# Patient Record
Sex: MALE | Race: WHITE | Employment: FULL TIME | URBAN - METROPOLITAN AREA
[De-identification: names, ages, dates, MRNs, and addresses within clinical notes are randomized per-mention and may not be internally consistent; named-entity substitution may affect disease eponyms.]

---

## 2024-08-27 PROBLEM — K40.90 LEFT INGUINAL HERNIA: Status: ACTIVE | Noted: 2024-08-27

## 2024-08-27 PROBLEM — R10.32 LEFT GROIN PAIN: Status: ACTIVE | Noted: 2024-08-27

## 2024-10-03 ENCOUNTER — HOSPITAL ENCOUNTER (OUTPATIENT)
Dept: PET IMAGING | Age: 64
Discharge: HOME | End: 2024-10-03
Payer: COMMERCIAL

## 2024-10-03 DIAGNOSIS — K62.89 RECTAL MASS: ICD-10-CM

## 2024-10-03 DIAGNOSIS — R91.8 LUNG NODULES: ICD-10-CM

## 2024-10-03 PROCEDURE — A9609 HC RX DIAGNOSTIC RADIOPHARMACEUTICAL: HCPCS | Performed by: FAMILY MEDICINE

## 2024-10-03 PROCEDURE — 2580000003 HC RX 258: Performed by: FAMILY MEDICINE

## 2024-10-03 PROCEDURE — 3430000000 HC RX DIAGNOSTIC RADIOPHARMACEUTICAL: Performed by: FAMILY MEDICINE

## 2024-10-03 PROCEDURE — 78815 PET IMAGE W/CT SKULL-THIGH: CPT

## 2024-10-03 RX ORDER — FLUDEOXYGLUCOSE F 18 200 MCI/ML
14.04 INJECTION, SOLUTION INTRAVENOUS
Status: COMPLETED | OUTPATIENT
Start: 2024-10-03 | End: 2024-10-03

## 2024-10-03 RX ORDER — SODIUM CHLORIDE 0.9 % (FLUSH) 0.9 %
10 SYRINGE (ML) INJECTION PRN
Status: COMPLETED | OUTPATIENT
Start: 2024-10-03 | End: 2024-10-03

## 2024-10-03 RX ADMIN — FLUDEOXYGLUCOSE F 18 14.04 MILLICURIE: 200 INJECTION, SOLUTION INTRAVENOUS at 08:03

## 2024-10-03 RX ADMIN — SODIUM CHLORIDE, PRESERVATIVE FREE 10 ML: 5 INJECTION INTRAVENOUS at 08:03

## 2024-10-09 ENCOUNTER — INITIAL CONSULT (OUTPATIENT)
Dept: CARDIOLOGY CLINIC | Age: 64
End: 2024-10-09
Payer: COMMERCIAL

## 2024-10-09 VITALS
SYSTOLIC BLOOD PRESSURE: 118 MMHG | HEIGHT: 67 IN | BODY MASS INDEX: 30.98 KG/M2 | HEART RATE: 63 BPM | DIASTOLIC BLOOD PRESSURE: 82 MMHG | WEIGHT: 197.4 LBS

## 2024-10-09 DIAGNOSIS — I71.21 ANEURYSM OF ASCENDING AORTA WITHOUT RUPTURE (HCC): Primary | ICD-10-CM

## 2024-10-09 PROCEDURE — G8417 CALC BMI ABV UP PARAM F/U: HCPCS | Performed by: INTERNAL MEDICINE

## 2024-10-09 PROCEDURE — 1036F TOBACCO NON-USER: CPT | Performed by: INTERNAL MEDICINE

## 2024-10-09 PROCEDURE — G8427 DOCREV CUR MEDS BY ELIG CLIN: HCPCS | Performed by: INTERNAL MEDICINE

## 2024-10-09 PROCEDURE — 99204 OFFICE O/P NEW MOD 45 MIN: CPT | Performed by: INTERNAL MEDICINE

## 2024-10-09 PROCEDURE — 3017F COLORECTAL CA SCREEN DOC REV: CPT | Performed by: INTERNAL MEDICINE

## 2024-10-09 PROCEDURE — 93000 ELECTROCARDIOGRAM COMPLETE: CPT | Performed by: INTERNAL MEDICINE

## 2024-10-09 PROCEDURE — G8484 FLU IMMUNIZE NO ADMIN: HCPCS | Performed by: INTERNAL MEDICINE

## 2024-10-09 RX ORDER — IBUPROFEN 800 MG/1
TABLET, FILM COATED ORAL PRN
COMMUNITY

## 2024-10-09 RX ORDER — TERBINAFINE HYDROCHLORIDE 250 MG/1
250 TABLET ORAL DAILY
COMMUNITY
Start: 2024-09-30

## 2024-10-09 NOTE — PROGRESS NOTES
Respiratory:  Resp Assessment: No abnormal findings.  Resp Auscultation: Vesicular breath sounds without rales or wheezing.  5. Cardiovascular:  Auscultation: Normal S1 & S2, No prominent murmurs  Carotid Arteries: No bruits present  Abdominal Aorta: Non-palpable  Pedal Pulses: 2+ and equal   6. Abdomen:  No masses or tenderness  Liver/Spleen: No Abnormalities Noted, no organomegaly.  7. Musculoskeletal: No joint deformities. No muscle wasting  8. Extremities:   No Cyanosis or Clubbing. No significant edema   9. Rectal / genital:   Deferred  10. Neurological/Psychiatric:  Oriented to time, place, and person  Non-anxious    No results found for: \"CKTOTAL\", \"CKMB\", \"CKMBINDEX\", \"TROPONINT\"  BNP:  No results found for: \"BNP\", \"PROBNP\"  PT/INR:  No results found for: \"PTINR\"  A1C:No results found for: \"LABA1C\"  Lipids:   Lab Results   Component Value Date    CHOL 206 (H) 06/16/2015    CHOL 221 (H) 02/27/2012     Lab Results   Component Value Date    TRIG 375 (H) 06/16/2015    TRIG 540 (H) 02/27/2012     Lab Results   Component Value Date    HDL 30 (L) 06/16/2015    HDL 30 (L) 02/27/2012     No results found for: \"LDL\"  No results found for: \"VLDL\"  No results found for: \"CHOLHDLRATIO\"   BMP:    Lab Results   Component Value Date/Time     06/16/2015 06:49 AM     02/27/2012 07:01 AM    K 4.3 06/16/2015 06:49 AM    K 4.0 02/27/2012 07:01 AM     06/16/2015 06:49 AM     02/27/2012 07:01 AM    CO2 26 06/16/2015 06:49 AM    CO2 31 02/27/2012 07:01 AM    BUN 13 06/16/2015 06:49 AM    BUN 15 02/27/2012 07:01 AM    CREATININE 0.9 06/16/2015 06:49 AM    CREATININE 0.8 02/27/2012 07:01 AM     CMP:  Lab Results   Component Value Date/Time     06/16/2015 06:49 AM     02/27/2012 07:01 AM    K 4.3 06/16/2015 06:49 AM    K 4.0 02/27/2012 07:01 AM     06/16/2015 06:49 AM     02/27/2012 07:01 AM    CO2 26 06/16/2015 06:49 AM    CO2 31 02/27/2012 07:01 AM    BUN 13 06/16/2015 06:49 AM    BUN

## 2024-10-10 ENCOUNTER — TELEPHONE (OUTPATIENT)
Dept: CARDIOLOGY CLINIC | Age: 64
End: 2024-10-10

## 2024-10-10 NOTE — TELEPHONE ENCOUNTER
Test Ordered:   CT Chest W & W/o Contrast    /  Insurance: [facility]  /  Authorization Status: No Auth Required

## 2025-07-18 ENCOUNTER — TELEPHONE (OUTPATIENT)
Dept: CARDIOLOGY CLINIC | Age: 65
End: 2025-07-18

## 2025-07-18 NOTE — PROGRESS NOTES
Patient Name: Zacarias Xavier  : 1960  MRN# [id number]      Insurance: Payor: / No coverage found.    Phone #: [phone]     REASON FOR VISIT: Results of Testing  Patient Active Problem List    Diagnosis Date Noted    Ascending aortic aneurysm 10/09/2024    Left groin pain 2024    Left inguinal hernia 2024     LABS:  Component 24   CHOLESTEROL, TOTAL 204 High   212 High     TRIGLYCERIDE 152 High   281 High     HDL CHOLESTEROL 36 Low   30    LDL (CALCULATED) 138 High   126    VLDL 30 56 High    No results found for: \"LABA1C\", \"[id number]\"

## 2025-07-22 ENCOUNTER — TELEPHONE (OUTPATIENT)
Dept: CARDIOLOGY CLINIC | Age: 65
End: 2025-07-22

## 2025-07-22 NOTE — TELEPHONE ENCOUNTER
Left message with patient to provide Cardiac appointment info:  Patient is scheduled at [facility] on 7/29/2025, with an arrival time of 01:30 pm and a start time of 02:00 pm.     Patient should bring photo id and insurance card.  Patient was advised that should the appointment need to be rescheduled, to contact Central Scheduling at [phone].      Patient will need to be NPO 4 hours prior.

## 2025-08-01 ENCOUNTER — OFFICE VISIT (OUTPATIENT)
Dept: CARDIOLOGY CLINIC | Age: 65
End: 2025-08-01

## 2025-08-01 DIAGNOSIS — I71.21 ANEURYSM OF ASCENDING AORTA WITHOUT RUPTURE: Primary | ICD-10-CM

## 2025-08-01 NOTE — PATIENT INSTRUCTIONS
Thank you for allowing us to care for you today!   We want to ensure we can follow your treatment plan and we strive to give you the best outcomes and experience possible.   If you ever have a life threatening emergency and call 911 - for an ambulance (EMS)  REMEMBER  Our providers can only care for you at:   [facility] or [facility]   Even if you have someone take you or you drive yourself we can only care for you in a [facility] facility. Our providers are not setup at the other healthcare locations!    PLEASE CALL OUR OFFICE DURING NORMAL BUSINESS HOURS  Monday through Friday 8 am to 5 pm  AFTER HOURS the physician on-call cannot help with scheduling, rescheduling, procedure instruction questions or any type of medication need or issue.  [facility] P:[phone] - [facility] P:[phone] - [facility] P:[phone]      If you receive a survey:  We would appreciate you taking the time to share your experience concerning your provider visit in the office.    These surveys are confidential!  We are eager to improve and are counting on you to share your feedback so we can ensure you get the best care possible.

## 2025-08-07 ENCOUNTER — HOSPITAL ENCOUNTER (OUTPATIENT)
Dept: CT IMAGING | Age: 65
Discharge: HOME | End: 2025-08-07
Attending: INTERNAL MEDICINE
Payer: COMMERCIAL

## 2025-08-07 DIAGNOSIS — I71.21 ANEURYSM OF ASCENDING AORTA WITHOUT RUPTURE: ICD-10-CM

## 2025-08-07 LAB
EGFR, POC: >90 ML/MIN/1.73M2
POC CREATININE: 0.9 MG/DL (ref 0.5–1.2)

## 2025-08-07 PROCEDURE — 71270 CT THORAX DX C-/C+: CPT

## 2025-08-07 PROCEDURE — 6360000004 HC RX CONTRAST MEDICATION: Performed by: INTERNAL MEDICINE

## 2025-08-07 PROCEDURE — 82565 ASSAY OF CREATININE: CPT

## 2025-08-07 RX ORDER — IOPAMIDOL 755 MG/ML
75 INJECTION, SOLUTION INTRAVASCULAR
Status: COMPLETED | OUTPATIENT
Start: 2025-08-07 | End: 2025-08-07

## 2025-08-07 RX ADMIN — IOPAMIDOL 75 ML: 755 INJECTION, SOLUTION INTRAVENOUS at 08:28

## 2025-08-26 ENCOUNTER — OFFICE VISIT (OUTPATIENT)
Dept: CARDIOLOGY CLINIC | Age: 65
End: 2025-08-26
Payer: OTHER GOVERNMENT

## 2025-08-26 VITALS
HEIGHT: 67 IN | BODY MASS INDEX: 30.92 KG/M2 | DIASTOLIC BLOOD PRESSURE: 80 MMHG | SYSTOLIC BLOOD PRESSURE: 130 MMHG | WEIGHT: 197 LBS | HEART RATE: 76 BPM

## 2025-08-26 DIAGNOSIS — I71.21 ANEURYSM OF ASCENDING AORTA WITHOUT RUPTURE: Primary | ICD-10-CM

## 2025-08-26 PROCEDURE — G8427 DOCREV CUR MEDS BY ELIG CLIN: HCPCS | Performed by: INTERNAL MEDICINE

## 2025-08-26 PROCEDURE — 3017F COLORECTAL CA SCREEN DOC REV: CPT | Performed by: INTERNAL MEDICINE

## 2025-08-26 PROCEDURE — 99214 OFFICE O/P EST MOD 30 MIN: CPT | Performed by: INTERNAL MEDICINE

## 2025-08-26 PROCEDURE — 1036F TOBACCO NON-USER: CPT | Performed by: INTERNAL MEDICINE

## 2025-08-26 PROCEDURE — G8417 CALC BMI ABV UP PARAM F/U: HCPCS | Performed by: INTERNAL MEDICINE

## 2025-08-27 ENCOUNTER — TELEPHONE (OUTPATIENT)
Dept: CARDIOLOGY CLINIC | Age: 65
End: 2025-08-27